# Patient Record
Sex: FEMALE | Race: WHITE | NOT HISPANIC OR LATINO | Employment: FULL TIME | URBAN - METROPOLITAN AREA
[De-identification: names, ages, dates, MRNs, and addresses within clinical notes are randomized per-mention and may not be internally consistent; named-entity substitution may affect disease eponyms.]

---

## 2023-06-07 LAB
ALANINE AMINOTRANSFERASE (SGPT) (U/L) IN SER/PLAS: 13 U/L (ref 7–45)
ALBUMIN (G/DL) IN SER/PLAS: 4.4 G/DL (ref 3.4–5)
ALKALINE PHOSPHATASE (U/L) IN SER/PLAS: 45 U/L (ref 33–110)
ANION GAP IN SER/PLAS: 14 MMOL/L (ref 10–20)
ASPARTATE AMINOTRANSFERASE (SGOT) (U/L) IN SER/PLAS: 17 U/L (ref 9–39)
BASOPHILS (10*3/UL) IN BLOOD BY AUTOMATED COUNT: 0.03 X10E9/L (ref 0–0.1)
BASOPHILS/100 LEUKOCYTES IN BLOOD BY AUTOMATED COUNT: 0.5 % (ref 0–2)
BILIRUBIN TOTAL (MG/DL) IN SER/PLAS: 0.5 MG/DL (ref 0–1.2)
CALCIDIOL (25 OH VITAMIN D3) (NG/ML) IN SER/PLAS: 59 NG/ML
CALCIUM (MG/DL) IN SER/PLAS: 9.3 MG/DL (ref 8.6–10.6)
CARBON DIOXIDE, TOTAL (MMOL/L) IN SER/PLAS: 25 MMOL/L (ref 21–32)
CHLORIDE (MMOL/L) IN SER/PLAS: 105 MMOL/L (ref 98–107)
CHOLESTEROL (MG/DL) IN SER/PLAS: 100 MG/DL (ref 0–199)
CHOLESTEROL IN HDL (MG/DL) IN SER/PLAS: 49.5 MG/DL
CHOLESTEROL/HDL RATIO: 2
CREATININE (MG/DL) IN SER/PLAS: 0.67 MG/DL (ref 0.5–1.05)
EOSINOPHILS (10*3/UL) IN BLOOD BY AUTOMATED COUNT: 0.04 X10E9/L (ref 0–0.7)
EOSINOPHILS/100 LEUKOCYTES IN BLOOD BY AUTOMATED COUNT: 0.6 % (ref 0–6)
ERYTHROCYTE DISTRIBUTION WIDTH (RATIO) BY AUTOMATED COUNT: 11.9 % (ref 11.5–14.5)
ERYTHROCYTE MEAN CORPUSCULAR HEMOGLOBIN CONCENTRATION (G/DL) BY AUTOMATED: 34.7 G/DL (ref 32–36)
ERYTHROCYTE MEAN CORPUSCULAR VOLUME (FL) BY AUTOMATED COUNT: 91 FL (ref 80–100)
ERYTHROCYTES (10*6/UL) IN BLOOD BY AUTOMATED COUNT: 4.07 X10E12/L (ref 4–5.2)
ESTIMATED AVERAGE GLUCOSE FOR HBA1C: 108 MG/DL
GAMMA GLUTAMYL TRANSFERASE (U/L) IN SER/PLAS: 10 U/L (ref 5–55)
GFR FEMALE: >90 ML/MIN/1.73M2
GLUCOSE (MG/DL) IN SER/PLAS: 76 MG/DL (ref 74–99)
HEMATOCRIT (%) IN BLOOD BY AUTOMATED COUNT: 36.9 % (ref 36–46)
HEMOGLOBIN (G/DL) IN BLOOD: 12.8 G/DL (ref 12–16)
HEMOGLOBIN A1C/HEMOGLOBIN TOTAL IN BLOOD: 5.4 %
IGE (IU/L) IN SERUM OR PLASMA: 12 IU/ML (ref 0–214)
IMMATURE GRANULOCYTES/100 LEUKOCYTES IN BLOOD BY AUTOMATED COUNT: 0.2 % (ref 0–0.9)
LDL: 41 MG/DL (ref 0–99)
LEUKOCYTES (10*3/UL) IN BLOOD BY AUTOMATED COUNT: 6.3 X10E9/L (ref 4.4–11.3)
LYMPHOCYTES (10*3/UL) IN BLOOD BY AUTOMATED COUNT: 1.79 X10E9/L (ref 1.2–4.8)
LYMPHOCYTES/100 LEUKOCYTES IN BLOOD BY AUTOMATED COUNT: 28.6 % (ref 13–44)
MONOCYTES (10*3/UL) IN BLOOD BY AUTOMATED COUNT: 0.33 X10E9/L (ref 0.1–1)
MONOCYTES/100 LEUKOCYTES IN BLOOD BY AUTOMATED COUNT: 5.3 % (ref 2–10)
NEUTROPHILS (10*3/UL) IN BLOOD BY AUTOMATED COUNT: 4.06 X10E9/L (ref 1.2–7.7)
NEUTROPHILS/100 LEUKOCYTES IN BLOOD BY AUTOMATED COUNT: 64.8 % (ref 40–80)
NRBC (PER 100 WBCS) BY AUTOMATED COUNT: 0 /100 WBC (ref 0–0)
PLATELETS (10*3/UL) IN BLOOD AUTOMATED COUNT: 297 X10E9/L (ref 150–450)
POTASSIUM (MMOL/L) IN SER/PLAS: 3.8 MMOL/L (ref 3.5–5.3)
PROTEIN TOTAL: 6.3 G/DL (ref 6.4–8.2)
SODIUM (MMOL/L) IN SER/PLAS: 140 MMOL/L (ref 136–145)
TRIGLYCERIDE (MG/DL) IN SER/PLAS: 46 MG/DL (ref 0–149)
UREA NITROGEN (MG/DL) IN SER/PLAS: 12 MG/DL (ref 6–23)
VLDL: 9 MG/DL (ref 0–40)

## 2023-06-10 LAB
RESPIRATORY CULTURE, CYSTIC FIBROSIS: ABNORMAL
RESPIRATORY CULTURE, CYSTIC FIBROSIS: ABNORMAL

## 2023-06-11 LAB
VITAMIN A (RETINOL): 0.62 MG/L (ref 0.3–1.2)
VITAMIN A (RETINYL PALMITATE): <0.02 MG/L (ref 0–0.1)
VITAMIN A, INTERPRETATION: NORMAL
VITAMIN E (ALPHA-TOCOPHEROL): 6.2 MG/L (ref 5.5–18)
VITAMIN E (GAMMA-TOCOPHEROL): 0.4 MG/L (ref 0–6)

## 2023-06-20 LAB
MISCELLANEUOUS TEST RESULT: NORMAL
MISCELLANEUOUS TEST RESULT: NORMAL
NAME OF SENDOUT TEST: NORMAL
NAME OF SENDOUT TEST: NORMAL

## 2023-06-26 LAB
FUNGAL CULTURE/SMEAR: NORMAL
FUNGAL SMEAR: NORMAL

## 2023-10-13 DIAGNOSIS — E84.9 CYSTIC FIBROSIS (MULTI): Primary | ICD-10-CM

## 2023-10-17 RX ORDER — ALBUTEROL SULFATE 0.83 MG/ML
2.5 SOLUTION RESPIRATORY (INHALATION) 4 TIMES DAILY PRN
Qty: 8.6 ML | Refills: 11 | Status: SHIPPED | OUTPATIENT
Start: 2023-10-17 | End: 2024-10-16

## 2023-10-17 RX ORDER — FLUTICASONE PROPIONATE 50 MCG
2 SPRAY, SUSPENSION (ML) NASAL DAILY
Qty: 16 G | Refills: 6 | Status: SHIPPED | OUTPATIENT
Start: 2023-10-17 | End: 2024-04-14

## 2023-10-24 ENCOUNTER — TELEPHONE (OUTPATIENT)
Dept: PEDIATRIC PULMONOLOGY | Facility: HOSPITAL | Age: 33
End: 2023-10-24

## 2023-10-24 RX ORDER — PANCRELIPASE LIPASE, PANCRELIPASE AMYLASE, AND PANCRELIPASE PROTEASE 21000; 83900; 54700 [USP'U]/1; [USP'U]/1; [USP'U]/1
5 CAPSULE, DELAYED RELEASE ORAL
COMMUNITY
End: 2023-10-27 | Stop reason: SDUPTHER

## 2023-10-27 DIAGNOSIS — K86.89 PANCREATIC INSUFFICIENCY DUE TO CYSTIC FIBROSIS (MULTI): ICD-10-CM

## 2023-10-27 DIAGNOSIS — K86.89 PANCREATIC INSUFFICIENCY DUE TO CYSTIC FIBROSIS (MULTI): Primary | ICD-10-CM

## 2023-10-27 DIAGNOSIS — E84.8 PANCREATIC INSUFFICIENCY DUE TO CYSTIC FIBROSIS (MULTI): Primary | ICD-10-CM

## 2023-10-27 DIAGNOSIS — E84.8 PANCREATIC INSUFFICIENCY DUE TO CYSTIC FIBROSIS (MULTI): ICD-10-CM

## 2023-10-27 RX ORDER — PANCRELIPASE LIPASE, PANCRELIPASE AMYLASE, AND PANCRELIPASE PROTEASE 21000; 83900; 54700 [USP'U]/1; [USP'U]/1; [USP'U]/1
5 CAPSULE, DELAYED RELEASE ORAL
Qty: 450 CAPSULE | Refills: 0 | Status: SHIPPED | OUTPATIENT
Start: 2023-10-27 | End: 2023-10-28 | Stop reason: SDUPTHER

## 2023-10-28 RX ORDER — PANCRELIPASE LIPASE, PANCRELIPASE AMYLASE, AND PANCRELIPASE PROTEASE 21000; 83900; 54700 [USP'U]/1; [USP'U]/1; [USP'U]/1
5 CAPSULE, DELAYED RELEASE ORAL
Qty: 450 CAPSULE | Refills: 0 | Status: SHIPPED | OUTPATIENT
Start: 2023-10-28 | End: 2023-10-28 | Stop reason: SDUPTHER

## 2023-10-28 RX ORDER — PANCRELIPASE LIPASE, PANCRELIPASE AMYLASE, AND PANCRELIPASE PROTEASE 21000; 83900; 54700 [USP'U]/1; [USP'U]/1; [USP'U]/1
5 CAPSULE, DELAYED RELEASE ORAL
Qty: 450 CAPSULE | Refills: 0 | Status: SHIPPED | OUTPATIENT
Start: 2023-10-28 | End: 2023-12-07 | Stop reason: SDUPTHER

## 2023-11-30 ENCOUNTER — TELEPHONE (OUTPATIENT)
Dept: PEDIATRIC PULMONOLOGY | Facility: HOSPITAL | Age: 33
End: 2023-11-30

## 2023-11-30 NOTE — TELEPHONE ENCOUNTER
RD called pt. and left voicemails on 11/16/23 and 11/30/23. The following email was sent 11/30/23. Will update when hear from the pt.      Jordyn Chakraborty,    I hope you are well. I wanted to check in since it's been a while since I've [facility] at [facility]. I wanted make sure that you're doing okay nutritionally and ask if you needed anything from me. Please feel free to respond to this email or call me at the below number. I'm available whichever way is easier for you. Thank you!      Emily Nageotte, RDN, LD  She/Her  Clinical Dietitian, [facility] [place] [address]    P: [phone]  F: [phone]

## 2023-12-07 ENCOUNTER — NUTRITION (OUTPATIENT)
Dept: PEDIATRIC PULMONOLOGY | Facility: HOSPITAL | Age: 33
End: 2023-12-07

## 2023-12-07 VITALS — BODY MASS INDEX: 21.59 KG/M2 | WEIGHT: 122 LBS

## 2023-12-07 DIAGNOSIS — E84.9 CYSTIC FIBROSIS (MULTI): ICD-10-CM

## 2023-12-07 DIAGNOSIS — K86.89 PANCREATIC INSUFFICIENCY DUE TO CYSTIC FIBROSIS (MULTI): ICD-10-CM

## 2023-12-07 DIAGNOSIS — E84.8 PANCREATIC INSUFFICIENCY DUE TO CYSTIC FIBROSIS (MULTI): ICD-10-CM

## 2023-12-07 RX ORDER — IVACAFTOR 150 MG/1
150 TABLET, FILM COATED ORAL EVERY 12 HOURS
COMMUNITY
Start: 2018-07-19 | End: 2023-12-07 | Stop reason: SDUPTHER

## 2023-12-07 NOTE — PROGRESS NOTES
The following emails were sent between 11/30/23 and 12/7/23.        I'm glad you are doing well. I don't need anything else from you now. Please don't hesitate to reach out in the future if anything comes up. Thanks!    Emily Nageotte, RDN, JUANA  She/Her  Clinical Dietitian, [facility] [place] [address]    P: [phone]  F: [phone]        From: Mylene Messer <[email or web address]>   Sent: Thursday, December 7, 2023 2:46 PM  To: Nageotte, Emily [email or web address]>  Subject: Re: Check In    I think my current weight is around 122lbs, I haven't weighed myself for a few months but my clothes fit the same! No GI concerns, no nausea/vomiting, constipation/greasy stools, no gas/bloating I am exercising I lift weights for about an hour     No GI concerns, no nausea/vomiting, constipation/greasy stools, no gas/bloating    I am exercising I lift weights for about an hour or more 3x a week, I walk my dogs for 40-60 minutes every day, I have been really bad about cardio workouts I need to get back into it!     I am able to get lots of different foods into my diet, I eat similar foods for breakfast and lunch but dinner is always something different.     Let me know if there is anything else you need!   Mylene     Sent from my [employer]    On Dec 7, 2023, at 9:45?AM, Nageotte, Emily [email or web address] wrote:  ?   Mylene,     That's really great to hear. Would you mind sending me a current weight when you have the chance. Also if you could answer the below questions, I would greatly appreciate it!     Any GI concerns? Nausea/vomiting? Constipation/Greasy stools? Gas/Bloating?     Exercise? If you are exercising, what activities are you doing and how often?     Are you able to find a variety of foods to incorporate into your diet (fruits, vegetables, dairy,  protein, etc.)?     Emily Nageotte, RDN, JUANA  She/Her  Clinical Dietitian, [facility] [address], [place] [address]     P: [phone]  F: [phone]              From: Mylene Messer <[email or web address]>   Sent: Tuesday, December 5, 2023 7:44 PM  To: Nageotte, Emily <[email or web address]  Subject: Re: Check In       Zak Berry!!      Thank you for checking in. I'm really bad at looking at my emails so I apologize for the delay! I'm doing great though, my weight is good and my appetite is right there with it. Again I appreciate the check in!      Happy holidays!   Mylene   Sent from my iPhone      On Nov 30, 2023, at 4:42?PM, Nageotte, Emily [email or web address] wrote:  ?   Jordyn Chakraborty,     I hope you are well. I wanted to check in since it's been a while [facility] I've [facility] at [facility]. I wanted make sure that you're doing okay nutritionally and ask if you needed anything from me. Please feel free to respond to this email or call me at the below number. I'm available whichever way is easier for you. Thank you!        Emily Nageotte, RDN, JUANA  She/Her  Clinical Dietitian, [facility] [address], [place] [address]     P: [phone]  F: [phone]

## 2023-12-08 DIAGNOSIS — E84.8 PANCREATIC INSUFFICIENCY DUE TO CYSTIC FIBROSIS (MULTI): ICD-10-CM

## 2023-12-08 DIAGNOSIS — K86.89 PANCREATIC INSUFFICIENCY DUE TO CYSTIC FIBROSIS (MULTI): ICD-10-CM

## 2023-12-08 DIAGNOSIS — E84.9 CYSTIC FIBROSIS (MULTI): ICD-10-CM

## 2023-12-08 RX ORDER — IVACAFTOR 150 MG/1
150 TABLET, FILM COATED ORAL EVERY 12 HOURS
Qty: 60 TABLET | Refills: 0 | Status: SHIPPED | OUTPATIENT
Start: 2023-12-08 | End: 2023-12-08 | Stop reason: SDUPTHER

## 2023-12-08 RX ORDER — PANCRELIPASE LIPASE, PANCRELIPASE AMYLASE, AND PANCRELIPASE PROTEASE 21000; 83900; 54700 [USP'U]/1; [USP'U]/1; [USP'U]/1
5 CAPSULE, DELAYED RELEASE ORAL
Qty: 450 CAPSULE | Refills: 0 | Status: SHIPPED | OUTPATIENT
Start: 2023-12-08 | End: 2024-01-07

## 2023-12-09 RX ORDER — IVACAFTOR 150 MG/1
150 TABLET, FILM COATED ORAL EVERY 12 HOURS
Qty: 56 TABLET | Refills: 0 | Status: SHIPPED | OUTPATIENT
Start: 2023-12-09 | End: 2024-02-07

## 2024-01-24 DIAGNOSIS — E84.8 PANCREATIC INSUFFICIENCY DUE TO CYSTIC FIBROSIS (MULTI): ICD-10-CM

## 2024-01-24 DIAGNOSIS — E84.9 CYSTIC FIBROSIS (MULTI): ICD-10-CM

## 2024-01-24 DIAGNOSIS — K86.89 PANCREATIC INSUFFICIENCY DUE TO CYSTIC FIBROSIS (MULTI): ICD-10-CM

## 2024-02-07 RX ORDER — IVACAFTOR 150 MG/1
TABLET, FILM COATED ORAL
Qty: 56 TABLET | Refills: 0 | Status: SHIPPED | OUTPATIENT
Start: 2024-02-07 | End: 2024-03-13